# Patient Record
Sex: FEMALE | Race: WHITE | Employment: UNEMPLOYED | URBAN - METROPOLITAN AREA
[De-identification: names, ages, dates, MRNs, and addresses within clinical notes are randomized per-mention and may not be internally consistent; named-entity substitution may affect disease eponyms.]

---

## 2017-12-26 ENCOUNTER — TELEPHONE (OUTPATIENT)
Dept: OBGYN CLINIC | Facility: CLINIC | Age: 27
End: 2017-12-26

## 2017-12-26 NOTE — TELEPHONE ENCOUNTER
New pt wants to come in for a letter stating she is pregnant , pt doesn't have insurance and has never been here before pts last period was 11/12 pt will be 8 weeks around 01/07/2018

## 2017-12-26 NOTE — TELEPHONE ENCOUNTER
Patient informed that she would have to be seen as a patient first in our office before letter can be generated. Patient encouraged to see a provider at a [facility] for pregnancy confirmation and then call our office for appointment.  Patient verbalizes un

## 2018-02-06 ENCOUNTER — APPOINTMENT (OUTPATIENT)
Dept: OBGYN CLINIC | Facility: CLINIC | Age: 28
End: 2018-02-06

## 2018-02-06 ENCOUNTER — OFFICE VISIT (OUTPATIENT)
Dept: OBGYN CLINIC | Facility: CLINIC | Age: 28
End: 2018-02-06

## 2018-02-06 VITALS
HEIGHT: 61 IN | BODY MASS INDEX: 20.2 KG/M2 | DIASTOLIC BLOOD PRESSURE: 58 MMHG | WEIGHT: 107 LBS | SYSTOLIC BLOOD PRESSURE: 100 MMHG

## 2018-02-06 DIAGNOSIS — Z34.81 PRENATAL CARE, SUBSEQUENT PREGNANCY, FIRST TRIMESTER: Primary | ICD-10-CM

## 2018-02-06 LAB
APPEARANCE: CLEAR
MULTISTIX LOT#: NORMAL NUMERIC
PH, URINE: 7 (ref 4.5–8)
SPECIFIC GRAVITY: 1.01 (ref 1–1.03)
URINE-COLOR: YELLOW
UROBILINOGEN,SEMI-QN: 0.2 MG/DL (ref 0–1.9)

## 2018-02-06 PROCEDURE — 81002 URINALYSIS NONAUTO W/O SCOPE: CPT | Performed by: OBSTETRICS & GYNECOLOGY

## 2018-02-06 PROCEDURE — 87591 N.GONORRHOEAE DNA AMP PROB: CPT | Performed by: OBSTETRICS & GYNECOLOGY

## 2018-02-06 PROCEDURE — 0500F INITIAL PRENATAL CARE VISIT: CPT | Performed by: OBSTETRICS & GYNECOLOGY

## 2018-02-06 PROCEDURE — 88175 CYTOPATH C/V AUTO FLUID REDO: CPT | Performed by: OBSTETRICS & GYNECOLOGY

## 2018-02-06 PROCEDURE — 87086 URINE CULTURE/COLONY COUNT: CPT | Performed by: OBSTETRICS & GYNECOLOGY

## 2018-02-06 PROCEDURE — 87491 CHLMYD TRACH DNA AMP PROBE: CPT | Performed by: OBSTETRICS & GYNECOLOGY

## 2018-02-06 PROCEDURE — 76801 OB US < 14 WKS SINGLE FETUS: CPT | Performed by: OBSTETRICS & GYNECOLOGY

## 2018-02-06 NOTE — PROGRESS NOTES
She had a previous miscarriage did not require a D&C. Has some nauseating getting better. No smoking no alcohol no drugs no DVTs no blood clot in her legs or lungs no abnormal Pap smears no hepatitis or herpes.   Desires testing for Down syndrome will add

## 2018-02-07 LAB
C TRACH DNA SPEC QL NAA+PROBE: NEGATIVE
N GONORRHOEA DNA SPEC QL NAA+PROBE: NEGATIVE

## 2018-02-15 ENCOUNTER — TELEPHONE (OUTPATIENT)
Dept: OBGYN CLINIC | Facility: CLINIC | Age: 28
End: 2018-02-15

## 2018-03-07 ENCOUNTER — APPOINTMENT (OUTPATIENT)
Dept: LAB | Age: 28
End: 2018-03-07
Attending: OBSTETRICS & GYNECOLOGY
Payer: MEDICAID

## 2018-03-07 ENCOUNTER — OFFICE VISIT (OUTPATIENT)
Dept: OBGYN CLINIC | Facility: CLINIC | Age: 28
End: 2018-03-07

## 2018-03-07 VITALS
DIASTOLIC BLOOD PRESSURE: 58 MMHG | BODY MASS INDEX: 20.77 KG/M2 | SYSTOLIC BLOOD PRESSURE: 118 MMHG | HEIGHT: 61 IN | WEIGHT: 110 LBS

## 2018-03-07 DIAGNOSIS — Z34.82 PRENATAL CARE, SUBSEQUENT PREGNANCY IN SECOND TRIMESTER: Primary | ICD-10-CM

## 2018-03-07 DIAGNOSIS — Z3A.16 16 WEEKS GESTATION OF PREGNANCY: ICD-10-CM

## 2018-03-07 DIAGNOSIS — Z36.3 SCREENING, ANTENATAL, FOR MALFORMATION BY ULTRASOUND: ICD-10-CM

## 2018-03-07 PROCEDURE — 82105 ALPHA-FETOPROTEIN SERUM: CPT | Performed by: OBSTETRICS & GYNECOLOGY

## 2018-03-07 PROCEDURE — 0502F SUBSEQUENT PRENATAL CARE: CPT | Performed by: OBSTETRICS & GYNECOLOGY

## 2018-03-12 LAB
AFP SMOKING: NO
FAMILY HX NEURAL TUBE DEFECT: NO
INSULIN REQ MATERNAL DIABETES: NO
MATERNAL AGE OF DELIVERY: 29 YR
MOM FOR AFP: 0.95
PATIENT'S AFP: 40 NG/ML

## 2018-04-06 ENCOUNTER — APPOINTMENT (OUTPATIENT)
Dept: OBGYN CLINIC | Facility: CLINIC | Age: 28
End: 2018-04-06

## 2018-04-06 ENCOUNTER — OFFICE VISIT (OUTPATIENT)
Dept: OBGYN CLINIC | Facility: CLINIC | Age: 28
End: 2018-04-06

## 2018-04-06 ENCOUNTER — LAB ENCOUNTER (OUTPATIENT)
Dept: LAB | Age: 28
End: 2018-04-06
Attending: OBSTETRICS & GYNECOLOGY
Payer: MEDICAID

## 2018-04-06 VITALS
DIASTOLIC BLOOD PRESSURE: 50 MMHG | HEIGHT: 61 IN | WEIGHT: 116 LBS | BODY MASS INDEX: 21.9 KG/M2 | SYSTOLIC BLOOD PRESSURE: 102 MMHG

## 2018-04-06 DIAGNOSIS — Z34.02 ENCOUNTER FOR SUPERVISION OF NORMAL FIRST PREGNANCY IN SECOND TRIMESTER: Primary | ICD-10-CM

## 2018-04-06 DIAGNOSIS — Z34.81 PRENATAL CARE, SUBSEQUENT PREGNANCY, FIRST TRIMESTER: ICD-10-CM

## 2018-04-06 PROCEDURE — 0502F SUBSEQUENT PRENATAL CARE: CPT | Performed by: OBSTETRICS & GYNECOLOGY

## 2018-04-06 PROCEDURE — 76805 OB US >/= 14 WKS SNGL FETUS: CPT | Performed by: OBSTETRICS & GYNECOLOGY

## 2018-04-06 PROCEDURE — 87389 HIV-1 AG W/HIV-1&-2 AB AG IA: CPT

## 2018-04-06 PROCEDURE — 87340 HEPATITIS B SURFACE AG IA: CPT

## 2018-04-06 PROCEDURE — 86780 TREPONEMA PALLIDUM: CPT

## 2018-04-06 PROCEDURE — 86762 RUBELLA ANTIBODY: CPT

## 2018-05-07 ENCOUNTER — LAB ENCOUNTER (OUTPATIENT)
Dept: LAB | Age: 28
End: 2018-05-07
Attending: OBSTETRICS & GYNECOLOGY
Payer: MEDICAID

## 2018-05-07 ENCOUNTER — OFFICE VISIT (OUTPATIENT)
Dept: OBGYN CLINIC | Facility: CLINIC | Age: 28
End: 2018-05-07

## 2018-05-07 VITALS
BODY MASS INDEX: 24.17 KG/M2 | DIASTOLIC BLOOD PRESSURE: 50 MMHG | WEIGHT: 128 LBS | HEIGHT: 61 IN | SYSTOLIC BLOOD PRESSURE: 108 MMHG

## 2018-05-07 DIAGNOSIS — Z34.02 ENCOUNTER FOR SUPERVISION OF NORMAL FIRST PREGNANCY IN SECOND TRIMESTER: Primary | ICD-10-CM

## 2018-05-07 DIAGNOSIS — Z34.02 ENCOUNTER FOR SUPERVISION OF NORMAL FIRST PREGNANCY IN SECOND TRIMESTER: ICD-10-CM

## 2018-05-07 PROBLEM — Z34.82 PRENATAL CARE, SUBSEQUENT PREGNANCY IN SECOND TRIMESTER: Status: ACTIVE | Noted: 2018-02-06

## 2018-05-07 PROCEDURE — 87389 HIV-1 AG W/HIV-1&-2 AB AG IA: CPT

## 2018-05-07 PROCEDURE — 87340 HEPATITIS B SURFACE AG IA: CPT

## 2018-05-07 PROCEDURE — 86762 RUBELLA ANTIBODY: CPT

## 2018-05-07 PROCEDURE — 82950 GLUCOSE TEST: CPT

## 2018-05-07 PROCEDURE — 86850 RBC ANTIBODY SCREEN: CPT

## 2018-05-07 PROCEDURE — 85025 COMPLETE CBC W/AUTO DIFF WBC: CPT

## 2018-05-07 PROCEDURE — 86900 BLOOD TYPING SEROLOGIC ABO: CPT

## 2018-05-07 PROCEDURE — 86780 TREPONEMA PALLIDUM: CPT

## 2018-05-07 PROCEDURE — 0502F SUBSEQUENT PRENATAL CARE: CPT | Performed by: OBSTETRICS & GYNECOLOGY

## 2018-05-07 PROCEDURE — 86901 BLOOD TYPING SEROLOGIC RH(D): CPT

## 2018-05-07 NOTE — PROGRESS NOTES
Pair childbirth and breast-feeding classes were discussed both at the hospital in the health department.   She did not have her prenatal labs drawn today they will be done with her GCT.  1 month

## 2018-06-04 ENCOUNTER — OFFICE VISIT (OUTPATIENT)
Dept: OBGYN CLINIC | Facility: CLINIC | Age: 28
End: 2018-06-04

## 2018-06-04 VITALS
DIASTOLIC BLOOD PRESSURE: 60 MMHG | HEIGHT: 61 IN | SYSTOLIC BLOOD PRESSURE: 120 MMHG | BODY MASS INDEX: 25.49 KG/M2 | WEIGHT: 135 LBS

## 2018-06-04 DIAGNOSIS — Z34.83 PRENATAL CARE, SUBSEQUENT PREGNANCY IN THIRD TRIMESTER: Primary | ICD-10-CM

## 2018-06-04 DIAGNOSIS — Z23 NEED FOR VACCINATION: ICD-10-CM

## 2018-06-04 DIAGNOSIS — Z3A.29 29 WEEKS GESTATION OF PREGNANCY: ICD-10-CM

## 2018-06-04 PROCEDURE — 90471 IMMUNIZATION ADMIN: CPT | Performed by: OBSTETRICS & GYNECOLOGY

## 2018-06-04 PROCEDURE — 90715 TDAP VACCINE 7 YRS/> IM: CPT | Performed by: OBSTETRICS & GYNECOLOGY

## 2018-06-04 PROCEDURE — 0502F SUBSEQUENT PRENATAL CARE: CPT | Performed by: OBSTETRICS & GYNECOLOGY

## 2018-06-05 ENCOUNTER — TELEPHONE (OUTPATIENT)
Dept: OBGYN CLINIC | Facility: CLINIC | Age: 28
End: 2018-06-05

## 2018-06-05 NOTE — TELEPHONE ENCOUNTER
Per  Pt she needs a note or a letter for her dentist to get treatment. Please advise and call pt.  Thanks

## 2018-06-18 ENCOUNTER — OFFICE VISIT (OUTPATIENT)
Dept: OBGYN CLINIC | Facility: CLINIC | Age: 28
End: 2018-06-18

## 2018-06-18 VITALS
DIASTOLIC BLOOD PRESSURE: 62 MMHG | WEIGHT: 135 LBS | HEIGHT: 61 IN | SYSTOLIC BLOOD PRESSURE: 120 MMHG | BODY MASS INDEX: 25.49 KG/M2

## 2018-06-18 DIAGNOSIS — Z3A.31 31 WEEKS GESTATION OF PREGNANCY: ICD-10-CM

## 2018-06-18 DIAGNOSIS — Z34.83 PRENATAL CARE, SUBSEQUENT PREGNANCY IN THIRD TRIMESTER: Primary | ICD-10-CM

## 2018-06-18 PROCEDURE — 0502F SUBSEQUENT PRENATAL CARE: CPT | Performed by: OBSTETRICS & GYNECOLOGY

## 2018-06-19 ENCOUNTER — LAB ENCOUNTER (OUTPATIENT)
Dept: LAB | Age: 28
End: 2018-06-19
Attending: OBSTETRICS & GYNECOLOGY
Payer: MEDICAID

## 2018-06-19 DIAGNOSIS — Z34.83 PRENATAL CARE, SUBSEQUENT PREGNANCY IN THIRD TRIMESTER: ICD-10-CM

## 2018-06-19 DIAGNOSIS — Z3A.31 31 WEEKS GESTATION OF PREGNANCY: ICD-10-CM

## 2018-06-19 PROCEDURE — 87389 HIV-1 AG W/HIV-1&-2 AB AG IA: CPT

## 2018-06-19 PROCEDURE — 36415 COLL VENOUS BLD VENIPUNCTURE: CPT

## 2018-07-02 ENCOUNTER — ROUTINE PRENATAL (OUTPATIENT)
Dept: OBGYN CLINIC | Facility: CLINIC | Age: 28
End: 2018-07-02

## 2018-07-02 VITALS
DIASTOLIC BLOOD PRESSURE: 66 MMHG | SYSTOLIC BLOOD PRESSURE: 110 MMHG | BODY MASS INDEX: 26.24 KG/M2 | WEIGHT: 139 LBS | HEIGHT: 61 IN

## 2018-07-02 DIAGNOSIS — Z34.83 PRENATAL CARE, SUBSEQUENT PREGNANCY IN THIRD TRIMESTER: Primary | ICD-10-CM

## 2018-07-02 DIAGNOSIS — Z3A.33 33 WEEKS GESTATION OF PREGNANCY: ICD-10-CM

## 2018-07-02 PROBLEM — Z34.82 PRENATAL CARE, SUBSEQUENT PREGNANCY IN SECOND TRIMESTER: Status: RESOLVED | Noted: 2018-02-06 | Resolved: 2018-07-02

## 2018-07-02 PROCEDURE — 0502F SUBSEQUENT PRENATAL CARE: CPT | Performed by: OBSTETRICS & GYNECOLOGY

## 2018-07-16 ENCOUNTER — ROUTINE PRENATAL (OUTPATIENT)
Dept: OBGYN CLINIC | Facility: CLINIC | Age: 28
End: 2018-07-16

## 2018-07-16 VITALS
HEIGHT: 61 IN | BODY MASS INDEX: 26.81 KG/M2 | SYSTOLIC BLOOD PRESSURE: 114 MMHG | WEIGHT: 142 LBS | DIASTOLIC BLOOD PRESSURE: 52 MMHG

## 2018-07-16 PROCEDURE — 0502F SUBSEQUENT PRENATAL CARE: CPT | Performed by: OBSTETRICS & GYNECOLOGY

## 2018-07-21 ENCOUNTER — TELEPHONE (OUTPATIENT)
Dept: OBGYN CLINIC | Facility: CLINIC | Age: 28
End: 2018-07-21

## 2018-07-24 ENCOUNTER — ROUTINE PRENATAL (OUTPATIENT)
Dept: OBGYN CLINIC | Facility: CLINIC | Age: 28
End: 2018-07-24
Payer: MEDICAID

## 2018-07-24 VITALS
HEIGHT: 61 IN | WEIGHT: 146 LBS | SYSTOLIC BLOOD PRESSURE: 122 MMHG | BODY MASS INDEX: 27.56 KG/M2 | DIASTOLIC BLOOD PRESSURE: 60 MMHG

## 2018-07-24 DIAGNOSIS — Z3A.36 36 WEEKS GESTATION OF PREGNANCY: ICD-10-CM

## 2018-07-24 DIAGNOSIS — Z34.83 PRENATAL CARE, SUBSEQUENT PREGNANCY IN THIRD TRIMESTER: Primary | ICD-10-CM

## 2018-07-24 PROCEDURE — 87081 CULTURE SCREEN ONLY: CPT | Performed by: OBSTETRICS & GYNECOLOGY

## 2018-07-24 PROCEDURE — 0502F SUBSEQUENT PRENATAL CARE: CPT | Performed by: OBSTETRICS & GYNECOLOGY

## 2018-07-24 PROCEDURE — 87653 STREP B DNA AMP PROBE: CPT | Performed by: OBSTETRICS & GYNECOLOGY

## 2018-07-31 ENCOUNTER — ROUTINE PRENATAL (OUTPATIENT)
Dept: OBGYN CLINIC | Facility: CLINIC | Age: 28
End: 2018-07-31
Payer: MEDICAID

## 2018-07-31 VITALS
SYSTOLIC BLOOD PRESSURE: 104 MMHG | BODY MASS INDEX: 27.75 KG/M2 | DIASTOLIC BLOOD PRESSURE: 62 MMHG | HEIGHT: 61 IN | WEIGHT: 147 LBS

## 2018-07-31 DIAGNOSIS — Z3A.37 37 WEEKS GESTATION OF PREGNANCY: ICD-10-CM

## 2018-07-31 DIAGNOSIS — Z34.83 PRENATAL CARE, SUBSEQUENT PREGNANCY IN THIRD TRIMESTER: Primary | ICD-10-CM

## 2018-07-31 PROCEDURE — 0502F SUBSEQUENT PRENATAL CARE: CPT | Performed by: OBSTETRICS & GYNECOLOGY

## 2018-08-07 ENCOUNTER — ROUTINE PRENATAL (OUTPATIENT)
Dept: OBGYN CLINIC | Facility: CLINIC | Age: 28
End: 2018-08-07
Payer: MEDICAID

## 2018-08-07 VITALS
BODY MASS INDEX: 27.94 KG/M2 | HEIGHT: 61 IN | DIASTOLIC BLOOD PRESSURE: 60 MMHG | SYSTOLIC BLOOD PRESSURE: 110 MMHG | WEIGHT: 148 LBS

## 2018-08-07 DIAGNOSIS — Z3A.38 38 WEEKS GESTATION OF PREGNANCY: ICD-10-CM

## 2018-08-07 DIAGNOSIS — Z34.83 PRENATAL CARE, SUBSEQUENT PREGNANCY IN THIRD TRIMESTER: Primary | ICD-10-CM

## 2018-08-07 PROCEDURE — 0502F SUBSEQUENT PRENATAL CARE: CPT | Performed by: OBSTETRICS & GYNECOLOGY

## 2018-08-14 ENCOUNTER — ROUTINE PRENATAL (OUTPATIENT)
Dept: OBGYN CLINIC | Facility: CLINIC | Age: 28
End: 2018-08-14
Payer: MEDICAID

## 2018-08-14 VITALS
HEIGHT: 61 IN | DIASTOLIC BLOOD PRESSURE: 60 MMHG | BODY MASS INDEX: 28.51 KG/M2 | WEIGHT: 151 LBS | SYSTOLIC BLOOD PRESSURE: 120 MMHG

## 2018-08-14 DIAGNOSIS — Z3A.39 39 WEEKS GESTATION OF PREGNANCY: ICD-10-CM

## 2018-08-14 DIAGNOSIS — Z34.83 PRENATAL CARE, SUBSEQUENT PREGNANCY IN THIRD TRIMESTER: Primary | ICD-10-CM

## 2018-08-14 PROCEDURE — 0502F SUBSEQUENT PRENATAL CARE: CPT | Performed by: OBSTETRICS & GYNECOLOGY

## 2018-08-16 ENCOUNTER — HOSPITAL ENCOUNTER (INPATIENT)
Dept: OBGYN CLINIC | Facility: HOSPITAL | Age: 28
Discharge: HOME | End: 2018-08-16
Payer: MEDICAID

## 2018-08-16 ENCOUNTER — HOSPITAL ENCOUNTER (INPATIENT)
Facility: HOSPITAL | Age: 28
LOS: 2 days | Discharge: HOME | End: 2018-08-18
Attending: OBSTETRICS & GYNECOLOGY | Admitting: OBSTETRICS & GYNECOLOGY
Payer: MEDICAID

## 2018-08-16 PROBLEM — Z34.90 PREGNANCY: Status: ACTIVE | Noted: 2018-08-16

## 2018-08-16 PROCEDURE — 59409 OBSTETRICAL CARE: CPT | Performed by: OBSTETRICS & GYNECOLOGY

## 2018-08-16 PROCEDURE — 3E033VJ INTRODUCTION OF OTHER HORMONE INTO PERIPHERAL VEIN, PERCUTANEOUS APPROACH: ICD-10-PCS | Performed by: OBSTETRICS & GYNECOLOGY

## 2018-08-16 PROCEDURE — 0KQM0ZZ REPAIR PERINEUM MUSCLE, OPEN APPROACH: ICD-10-PCS | Performed by: OBSTETRICS & GYNECOLOGY

## 2018-08-16 RX ORDER — NALBUPHINE HCL 10 MG/ML
2.5 AMPUL (ML) INJECTION
Status: DISCONTINUED | OUTPATIENT
Start: 2018-08-16 | End: 2018-08-18

## 2018-08-16 RX ORDER — DEXTROSE, SODIUM CHLORIDE, SODIUM LACTATE, POTASSIUM CHLORIDE, AND CALCIUM CHLORIDE 5; .6; .31; .03; .02 G/100ML; G/100ML; G/100ML; G/100ML; G/100ML
INJECTION, SOLUTION INTRAVENOUS AS NEEDED
Status: DISCONTINUED | OUTPATIENT
Start: 2018-08-16 | End: 2018-08-16 | Stop reason: HOSPADM

## 2018-08-16 RX ORDER — TRISODIUM CITRATE DIHYDRATE AND CITRIC ACID MONOHYDRATE 500; 334 MG/5ML; MG/5ML
30 SOLUTION ORAL AS NEEDED
Status: DISCONTINUED | OUTPATIENT
Start: 2018-08-16 | End: 2018-08-16 | Stop reason: HOSPADM

## 2018-08-16 RX ORDER — ONDANSETRON 2 MG/ML
4 INJECTION INTRAMUSCULAR; INTRAVENOUS EVERY 6 HOURS PRN
Status: DISCONTINUED | OUTPATIENT
Start: 2018-08-16 | End: 2018-08-16 | Stop reason: HOSPADM

## 2018-08-16 RX ORDER — ACETAMINOPHEN 325 MG/1
650 TABLET ORAL EVERY 6 HOURS PRN
Status: DISCONTINUED | OUTPATIENT
Start: 2018-08-16 | End: 2018-08-16

## 2018-08-16 RX ORDER — ZOLPIDEM TARTRATE 5 MG/1
5 TABLET ORAL NIGHTLY PRN
Status: DISCONTINUED | OUTPATIENT
Start: 2018-08-16 | End: 2018-08-18

## 2018-08-16 RX ORDER — IBUPROFEN 600 MG/1
600 TABLET ORAL ONCE AS NEEDED
Status: DISCONTINUED | OUTPATIENT
Start: 2018-08-16 | End: 2018-08-16 | Stop reason: HOSPADM

## 2018-08-16 RX ORDER — EPHEDRINE SULFATE/0.9% NACL/PF 25 MG/5 ML
5 SYRINGE (ML) INTRAVENOUS AS NEEDED
Status: DISCONTINUED | OUTPATIENT
Start: 2018-08-16 | End: 2018-08-18

## 2018-08-16 RX ORDER — DOCUSATE SODIUM 100 MG/1
100 CAPSULE, LIQUID FILLED ORAL
Status: DISCONTINUED | OUTPATIENT
Start: 2018-08-16 | End: 2018-08-18

## 2018-08-16 RX ORDER — SODIUM CHLORIDE, SODIUM LACTATE, POTASSIUM CHLORIDE, CALCIUM CHLORIDE 600; 310; 30; 20 MG/100ML; MG/100ML; MG/100ML; MG/100ML
INJECTION, SOLUTION INTRAVENOUS CONTINUOUS
Status: DISCONTINUED | OUTPATIENT
Start: 2018-08-16 | End: 2018-08-16 | Stop reason: HOSPADM

## 2018-08-16 RX ORDER — DOCUSATE SODIUM 100 MG/1
100 CAPSULE, LIQUID FILLED ORAL
Status: DISCONTINUED | OUTPATIENT
Start: 2018-08-16 | End: 2018-08-16

## 2018-08-16 RX ORDER — BISACODYL 10 MG
10 SUPPOSITORY, RECTAL RECTAL ONCE AS NEEDED
Status: ACTIVE | OUTPATIENT
Start: 2018-08-16 | End: 2018-08-16

## 2018-08-16 RX ORDER — SIMETHICONE 80 MG
80 TABLET,CHEWABLE ORAL 3 TIMES DAILY PRN
Status: DISCONTINUED | OUTPATIENT
Start: 2018-08-16 | End: 2018-08-16

## 2018-08-16 RX ORDER — IBUPROFEN 600 MG/1
600 TABLET ORAL EVERY 6 HOURS
Status: DISCONTINUED | OUTPATIENT
Start: 2018-08-17 | End: 2018-08-18

## 2018-08-16 RX ORDER — IBUPROFEN 600 MG/1
600 TABLET ORAL EVERY 6 HOURS
Status: DISCONTINUED | OUTPATIENT
Start: 2018-08-16 | End: 2018-08-16

## 2018-08-16 RX ORDER — BISACODYL 10 MG
10 SUPPOSITORY, RECTAL RECTAL ONCE AS NEEDED
Status: DISCONTINUED | OUTPATIENT
Start: 2018-08-16 | End: 2018-08-16

## 2018-08-16 RX ORDER — SIMETHICONE 80 MG
80 TABLET,CHEWABLE ORAL 3 TIMES DAILY PRN
Status: DISCONTINUED | OUTPATIENT
Start: 2018-08-16 | End: 2018-08-18

## 2018-08-16 RX ORDER — ACETAMINOPHEN 325 MG/1
650 TABLET ORAL EVERY 6 HOURS PRN
Status: DISCONTINUED | OUTPATIENT
Start: 2018-08-16 | End: 2018-08-18

## 2018-08-16 RX ORDER — ZOLPIDEM TARTRATE 5 MG/1
5 TABLET ORAL NIGHTLY PRN
Status: DISCONTINUED | OUTPATIENT
Start: 2018-08-16 | End: 2018-08-16

## 2018-08-16 RX ORDER — TERBUTALINE SULFATE 1 MG/ML
0.25 INJECTION, SOLUTION SUBCUTANEOUS AS NEEDED
Status: DISCONTINUED | OUTPATIENT
Start: 2018-08-16 | End: 2018-08-16 | Stop reason: HOSPADM

## 2018-08-16 NOTE — H&P
[address]    PATIENT'S NAME: Kera Puente   ATTENDING PHYSICIAN: Juan Ellison M.D.    PATIENT ACCOUNT#:   [de-identified]    LOCATION:  [address] [place] [address]  MEDICAL RECORD #:   [id number]       YOB: 1990  ADMISSION DATE:       08/16/2018

## 2018-08-16 NOTE — PROGRESS NOTES
Pt is a 28year old female admitted to [facility]. Patient presents with:  Scheduled Induction     Pt is  39w4d intra-uterine pregnancy. History obtained, consents signed. Oriented to room, staff, and plan of care. All questions answered.  Both pat

## 2018-08-17 NOTE — PROGRESS NOTES
[facility]  Post-Partum Progress Note    Ladonna Forbes Patient Status:  Inpatient    1990 MRN [id number]   [facility] 2SW-J Attending Deborah Schmidt MD   Hosp Day # 1 PCP No primary care provider on file.      SUBJECTIVE:    Postpar

## 2018-08-17 NOTE — L&D DELIVERY NOTE
[address]    PATIENT'S NAME: Ava Avina   ATTENDING PHYSICIAN: Ruben Holland M.D.    PATIENT ACCOUNT #: [de-identified] LOCATION:  [address] [place] [address]   MEDICAL RECORD #: [id number] YOB: 1990   ADMISSION DATE: 08/16/2018 DELIVERY DATE:

## 2018-08-17 NOTE — PROGRESS NOTES
Pt transferred to Post partum unit via wheelchair holding infant. Pt accompanied by . Pt transferred with all personal belongings, voicing no complaints upon transfer. Report to be given upon arrival on post partum unit. Pt in stable condition.

## 2018-08-18 VITALS
WEIGHT: 151 LBS | RESPIRATION RATE: 20 BRPM | BODY MASS INDEX: 28.51 KG/M2 | OXYGEN SATURATION: 100 % | HEART RATE: 76 BPM | TEMPERATURE: 98 F | DIASTOLIC BLOOD PRESSURE: 88 MMHG | HEIGHT: 61 IN | SYSTOLIC BLOOD PRESSURE: 131 MMHG

## 2018-08-18 NOTE — PROGRESS NOTES
Patient in stable condition. Discharge instructions given. Id bands verified. Hugs and kisses tags removed. Per infant safety seat to auto by staff in mother's arms, taken by wheel chair.

## 2018-08-18 NOTE — PROGRESS NOTES
[facility]  Post-Partum Progress Note    Love Neal Patient Status:  Inpatient    1990 MRN [id number]   [facility] 2SW-J Attending Oni Moran MD   Hosp Day # 2 PCP No primary care provider on file.      SUBJECTIVE:  Patient d

## 2018-08-20 ENCOUNTER — TELEPHONE (OUTPATIENT)
Dept: OBGYN CLINIC | Facility: CLINIC | Age: 28
End: 2018-08-20

## 2018-08-20 NOTE — TELEPHONE ENCOUNTER
Patient states she has been having pain in her buttock's and lower back for the past few days. Patient instructed to try applying heat, Ibuprofen and if no relieve she should see her PMD for further evaluation. Patient verbalizes understanding.

## 2018-08-20 NOTE — TELEPHONE ENCOUNTER
Pt delivered on 08-16 pt is stating that now she is having some pain in her right leg , pt would like to speak with nurse

## 2018-08-21 ENCOUNTER — POSTPARTUM (OUTPATIENT)
Dept: OBGYN CLINIC | Facility: CLINIC | Age: 28
End: 2018-08-21
Payer: MEDICAID

## 2018-08-21 ENCOUNTER — OFFICE VISIT (OUTPATIENT)
Dept: PHYSICAL THERAPY | Age: 28
End: 2018-08-21
Attending: OBSTETRICS & GYNECOLOGY
Payer: MEDICAID

## 2018-08-21 ENCOUNTER — NURSE ONLY (OUTPATIENT)
Dept: LACTATION | Facility: HOSPITAL | Age: 28
End: 2018-08-21
Attending: OBSTETRICS & GYNECOLOGY
Payer: MEDICAID

## 2018-08-21 ENCOUNTER — TELEPHONE (OUTPATIENT)
Dept: OBGYN UNIT | Facility: HOSPITAL | Age: 28
End: 2018-08-21

## 2018-08-21 VITALS
RESPIRATION RATE: 16 BRPM | TEMPERATURE: 97 F | BODY MASS INDEX: 28.51 KG/M2 | HEART RATE: 88 BPM | WEIGHT: 151 LBS | HEIGHT: 61 IN | DIASTOLIC BLOOD PRESSURE: 70 MMHG | SYSTOLIC BLOOD PRESSURE: 130 MMHG

## 2018-08-21 VITALS — TEMPERATURE: 98 F

## 2018-08-21 DIAGNOSIS — Z91.89 BREASTFEEDING PROBLEM: ICD-10-CM

## 2018-08-21 DIAGNOSIS — M79.18 ACUTE BUTTOCK PAIN: ICD-10-CM

## 2018-08-21 DIAGNOSIS — O92.29 SORE NIPPLES DUE TO LACTATION: Primary | ICD-10-CM

## 2018-08-21 DIAGNOSIS — M79.18 ACUTE BUTTOCK PAIN: Primary | ICD-10-CM

## 2018-08-21 PROCEDURE — 97014 ELECTRIC STIMULATION THERAPY: CPT

## 2018-08-21 PROCEDURE — 97161 PT EVAL LOW COMPLEX 20 MIN: CPT

## 2018-08-21 PROCEDURE — 99212 OFFICE O/P EST SF 10 MIN: CPT | Performed by: OBSTETRICS & GYNECOLOGY

## 2018-08-21 PROCEDURE — 99214 OFFICE O/P EST MOD 30 MIN: CPT

## 2018-08-21 PROCEDURE — A6206 CONTACT LAYER <= 16 SQ IN: HCPCS

## 2018-08-21 NOTE — PROGRESS NOTES
SPINE EVALUATION:   Referring Physician: Dr. Elsa Peña  Diagnosis: R buttock pain   Date of Service: 8/21/2018     PATIENT Anna Bautista is a 28year old y/o female who presents to therapy today with complaints of  R buttock pain that began on distraction on the R did not change symptoms    Balance: SLS R  NT, L  NT     Today’s Treatment and Response: STM and estim with CP to the R buttock today. Pt did not feel any change following her visit.    Patient education provided on  [facility] stretch- will care.    X___________________________________________________ Date____________________    Certification From: 3/86/7656  To:11/19/2018

## 2018-08-21 NOTE — PROGRESS NOTES
Patient had  18, for the past 3 days c/o sever right buttock pain, worse with walking    /70 (BP Location: Left arm, Patient Position: Sitting, Cuff Size: adult)   Pulse 88   Temp 96.7 °F (35.9 °C) (Tympanic)   Resp 16   Ht 61\"   Wt 151 lb

## 2018-08-21 NOTE — PATIENT INSTRUCTIONS
Breastfeeding suggestions to increase milk supply    Full evaluation of your current milk supply and your infant's transfer of breastmilk is important prior to initiation of mediations.     Review of your history and treatment of any medical conditions by jessica before offering formula. · Continue to pump both breasts for 10-15 minutes anytime a supplement is needed. A hospital grade rental pump is recommended.     Discuss the following medications with your physician and your baby's physician:  As with any medic breastmilk? · Swallowing with most sucks (every 1-3 sucks) until satisfied at least 8-12 times every 24 hours. · Compressing the breast when your baby sucks can increase milk flow.   · At least 6-8 wet diapers and at least 3-4 soft, yellow seedy stools ev For additional information: [email or web address]        Guidelines for Using a Nipple Shield    Refer to the [employer]. These are additional suggestions only.   • This thin silicone nipple shield (size 20 mm) has been minutes of regular swallowing at the breast, you may want to try to reattach your baby to your breast without the shield.   • When your baby’s swallowing slows on the first side, repeat this process on the other breast.   • If needed, trickle drops of your inadequate. • Follow-up visits with your lactation consultant and baby’s health care provider are necessary when using the shield. • Your baby’s weight should be checked 1-2 times each week while using a nipple shield.         The [facility] Breast suction low then increase the suction to the highest suction that is comfortable for you. Remember pumping should never be painful. • If breast milk flow is minimal, try increasing suction if it is comfortable to do so.   NOTE: Initially, in the colostrum levels are higher at night. Increasing milk flow to baby if breastfeeding:   Improve your baby’s position and latch. Positioning:   • Your hand at neck/shoulders, not the back of head.    • Line chin with the bottom of your areola  Latching on:  • Expre milk pumped for the next baby feeding. Call [facility] with any questions or concerns related to BF or pumping at [phone]. Abhi vickers.

## 2018-08-21 NOTE — PROGRESS NOTES
Reviewed self and infant care w / mom, she verbalizes understanding of instructions reviewed. Encourage to follow up w/ MDs as directed and w/ questions/concerns. Enc to go to ct. C/o ight leg pain, seen by Dr Sindhu Jaramillo today and starting PT this week.

## 2018-08-22 ENCOUNTER — OFFICE VISIT (OUTPATIENT)
Dept: PHYSICAL THERAPY | Age: 28
End: 2018-08-22
Attending: OBSTETRICS & GYNECOLOGY
Payer: MEDICAID

## 2018-08-22 PROCEDURE — 97110 THERAPEUTIC EXERCISES: CPT

## 2018-08-22 PROCEDURE — 97140 MANUAL THERAPY 1/> REGIONS: CPT

## 2018-08-22 NOTE — PROGRESS NOTES
Dx:  R buttock pain - sciatica on the R        Authorized # of Visits:   10          Next MD visit: none scheduled  Fall Risk: standard         Precautions: n/a             Subjective:  Feel slightly better today. 9./10 can walk a little faster.      Object

## 2018-08-27 ENCOUNTER — TELEPHONE (OUTPATIENT)
Dept: OBGYN CLINIC | Facility: CLINIC | Age: 28
End: 2018-08-27

## 2018-08-28 ENCOUNTER — NURSE ONLY (OUTPATIENT)
Dept: LACTATION | Facility: HOSPITAL | Age: 28
End: 2018-08-28
Attending: OBSTETRICS & GYNECOLOGY
Payer: MEDICAID

## 2018-08-28 VITALS — TEMPERATURE: 98 F

## 2018-08-28 DIAGNOSIS — Z91.89 AT RISK FOR INEFFECTIVE BREASTFEEDING: ICD-10-CM

## 2018-08-28 DIAGNOSIS — Z91.89 BREASTFEEDING PROBLEM: ICD-10-CM

## 2018-08-28 PROCEDURE — 99213 OFFICE O/P EST LOW 20 MIN: CPT

## 2018-08-28 NOTE — PATIENT INSTRUCTIONS
Breastfeeding suggestions to increase milk supply    Full evaluation of your current milk supply and your infant's transfer of breastmilk is important prior to initiation of mediations.     Review of your history and treatment of any medical conditions by jessica before offering formula. · Continue to pump both breasts for 10-15 minutes anytime a supplement is needed. A hospital grade rental pump is recommended.     Discuss the following medications with your physician and your baby's physician:  As with any medic breastmilk? · Swallowing with most sucks (every 1-3 sucks) until satisfied at least 8-12 times every 24 hours. · Compressing the breast when your baby sucks can increase milk flow.   · At least 6-8 wet diapers and at least 3-4 soft, yellow seedy stools ev Call you baby's doctor with questions as well. Weight check sooner if wet or stool diapers decrease. Have weight checked again within 1-3 days of decreasing/stopping supplements. For additional information: [employer]. suction is recommended. Begin with suction low then increase the suction to the highest suction that is comfortable for you. Remember pumping should never be painful. • If breast milk flow is minimal, try increasing suction if it is comfortable to do so. and/or pumping sessions. Your hormone levels are higher at night. Increasing milk flow to baby if breastfeeding:   Improve your baby’s position and latch. Positioning:   • Your hand at neck/shoulders, not the back of head.    • Line chin with the bottom touches nipple, including pumping. • Infant has symptoms of yeast in mouth or a diaper rash. Symptoms of a yeast infection in baby:  • White or red spots on the gums, inside of cheeks or under tongue. • White coating on the tongue.   • Diaper rash, es cheeks. It may be most effective to apply the medication after each feeding, but check with your baby’s   health care provider regarding instructions for use.   • If your baby has a yeast diaper rash, you may be prescribed an anti-yeast cream to apply to it persistent cases of yeast:  o A combination cream may be prescribed (all purpose nipple ointment). Contact your provider and lactation consultant for more information.    o Gentian violet may be recommended – before using, discuss the use of this with your

## 2018-08-29 ENCOUNTER — OFFICE VISIT (OUTPATIENT)
Dept: PHYSICAL THERAPY | Age: 28
End: 2018-08-29
Attending: OBSTETRICS & GYNECOLOGY
Payer: MEDICAID

## 2018-08-29 PROCEDURE — 97012 MECHANICAL TRACTION THERAPY: CPT

## 2018-08-29 PROCEDURE — 97110 THERAPEUTIC EXERCISES: CPT

## 2018-08-29 NOTE — PROGRESS NOTES
Dx:  R buttock pain - sciatica on the R        Authorized # of Visits:   10          Next MD visit: none scheduled  Fall Risk: standard         Precautions: n/a             Subjective:  Feel slightly better today.   Right buttock pain 7/10 today     Objecti min  Total Treatment Time: 45 min

## 2018-09-06 ENCOUNTER — OFFICE VISIT (OUTPATIENT)
Dept: PHYSICAL THERAPY | Age: 28
End: 2018-09-06
Attending: OBSTETRICS & GYNECOLOGY
Payer: MEDICAID

## 2018-09-06 PROCEDURE — 97012 MECHANICAL TRACTION THERAPY: CPT

## 2018-09-06 PROCEDURE — 97110 THERAPEUTIC EXERCISES: CPT

## 2018-09-06 PROCEDURE — 97140 MANUAL THERAPY 1/> REGIONS: CPT

## 2018-09-06 NOTE — PROGRESS NOTES
Dx:  R buttock pain - sciatica on the R        Authorized # of Visits:   10          Next MD visit: none scheduled  Fall Risk: standard         Precautions: n/a             Subjective:  Feel slightly better today.  Pt still walking with left lateral lean wi Charges:  EX  1 Mech traction 1  MT 1      Total Timed Treatment: 45 min  Total Treatment Time: 45 min

## 2018-09-11 ENCOUNTER — OFFICE VISIT (OUTPATIENT)
Dept: PHYSICAL THERAPY | Age: 28
End: 2018-09-11
Attending: OBSTETRICS & GYNECOLOGY
Payer: MEDICAID

## 2018-09-11 PROCEDURE — 97140 MANUAL THERAPY 1/> REGIONS: CPT

## 2018-09-11 PROCEDURE — 97012 MECHANICAL TRACTION THERAPY: CPT

## 2018-09-11 PROCEDURE — 97110 THERAPEUTIC EXERCISES: CPT

## 2018-09-11 NOTE — PROGRESS NOTES
Dx:  R buttock pain - sciatica on the R        Authorized # of Visits:   10          Next MD visit: none scheduled  Fall Risk: standard         Precautions: n/a             Subjective:  Feeling a lot better.  Pt is walking a lot more erect, less lateral elvis green band  Flex/ext 10 x each side      PPU  Clams YTB  Bridge with and without ball add 10 x each  40 to 35#  Side lying pelvic stretch 3 x 30 secs  Long axis distraction  3 x 30 secs R  Foam rolling Piriformis B     mech traction 15 mins 45-35#

## 2018-09-20 ENCOUNTER — APPOINTMENT (OUTPATIENT)
Dept: PHYSICAL THERAPY | Age: 28
End: 2018-09-20
Attending: OBSTETRICS & GYNECOLOGY
Payer: MEDICAID

## 2018-09-25 ENCOUNTER — OFFICE VISIT (OUTPATIENT)
Dept: PHYSICAL THERAPY | Age: 28
End: 2018-09-25
Attending: OBSTETRICS & GYNECOLOGY
Payer: MEDICAID

## 2018-09-25 PROCEDURE — 97140 MANUAL THERAPY 1/> REGIONS: CPT

## 2018-09-25 NOTE — PROGRESS NOTES
Dx:  R buttock pain - sciatica on the R        Authorized # of Visits:   10          Next MD visit: none scheduled  Fall Risk: standard         Precautions: n/a             Subjective:   Feeling so much better. Patient brought 11 week old baby today.  Pt wal x   No habds 10 x 2  HS stretch   And piriformis stretch 3 x 30 secs each B      HS stretch 2 x 30 secs Mechanical traction 15 mins today  Ext hip - green band   Lateral walk with green band   Lateral side walking green band  Flex/ext 10 x each side Re-ass

## 2018-10-01 ENCOUNTER — POSTPARTUM (OUTPATIENT)
Dept: OBGYN CLINIC | Facility: CLINIC | Age: 28
End: 2018-10-01
Payer: MEDICAID

## 2018-10-01 VITALS
SYSTOLIC BLOOD PRESSURE: 122 MMHG | HEART RATE: 72 BPM | WEIGHT: 127 LBS | BODY MASS INDEX: 23.98 KG/M2 | HEIGHT: 61 IN | RESPIRATION RATE: 16 BRPM | DIASTOLIC BLOOD PRESSURE: 80 MMHG

## 2018-10-01 NOTE — PROGRESS NOTES
AJ Valencia is a 33year old female  here for 6 week post-partum visit. Patient delivered a  female infant on 18 via . Patient desires nothing for contraception. Patient is bottle feeding.    Patient denies symptoms of depression size, contour, position, mobility, without tenderness  Adnexa: normal without masses or tenderness  Perineum: well healed perineum  Anus: no hemorroids       ASSESSMENT/PLAN  Efrenmerritt Riose was seen today for postpartum care.     Diagnoses and all orders for this

## 2020-05-05 ENCOUNTER — TELEPHONE (OUTPATIENT)
Dept: OBGYN CLINIC | Facility: CLINIC | Age: 30
End: 2020-05-05

## 2020-05-05 NOTE — TELEPHONE ENCOUNTER
Patient called and told to contact [facility] or [address] #. Verbalized understanding.

## 2020-05-05 NOTE — TELEPHONE ENCOUNTER
[facility]- and it was positive. She says she is not ready to have another baby and would like to know her options. Please advise and call pt with options, she is very early on her pregnancy lmp 04-.  Sandra Lizama

## 2025-05-05 NOTE — PLAN OF CARE
Problem: POSTPARTUM  Goal: Long Term Goal:Experiences normal postpartum course  INTERVENTIONS:  - Assess and monitor vital signs and lab values. - Assess fundus and lochia. - Provide ice/sitz baths for perineum discomfort.   - Monitor healing of incision/ provide assistance with proper latch. - Review techniques for milk expression (breast pumping) and storage of breast milk. Provide pumping equipment/supplies, instructions and assistance, as needed. - Encourage rooming-in and breast feeding on demand.   - PAST MEDICAL HISTORY:  Chronic retention of urine     COPD (chronic obstructive pulmonary disease)     COPD (chronic obstructive pulmonary disease)     Diabetes type 2, controlled     Dyslipidemia     Dyslipidemia     Paranoid schizophrenia     Schizophrenia

## (undated) NOTE — LETTER
18          RE:  Pat Valencia  :  1990      To Whom It May Concern:    Pat Valencia  is currently under our care for pregnancy. It is permissible at her gestational age for dental work to be performed.   However, if x-rays are needed, please